# Patient Record
Sex: MALE | Race: WHITE | Employment: FULL TIME | URBAN - METROPOLITAN AREA
[De-identification: names, ages, dates, MRNs, and addresses within clinical notes are randomized per-mention and may not be internally consistent; named-entity substitution may affect disease eponyms.]

---

## 2021-03-18 ENCOUNTER — OFFICE VISIT (OUTPATIENT)
Dept: INTERNAL MEDICINE CLINIC | Age: 31
End: 2021-03-18
Payer: COMMERCIAL

## 2021-03-18 VITALS
OXYGEN SATURATION: 97 % | TEMPERATURE: 97.8 F | BODY MASS INDEX: 23.66 KG/M2 | RESPIRATION RATE: 18 BRPM | WEIGHT: 169 LBS | DIASTOLIC BLOOD PRESSURE: 80 MMHG | HEIGHT: 71 IN | HEART RATE: 78 BPM | SYSTOLIC BLOOD PRESSURE: 122 MMHG

## 2021-03-18 DIAGNOSIS — F17.200 NICOTINE DEPENDENCE, UNCOMPLICATED, UNSPECIFIED NICOTINE PRODUCT TYPE: ICD-10-CM

## 2021-03-18 DIAGNOSIS — E55.9 VITAMIN D DEFICIENCY: ICD-10-CM

## 2021-03-18 DIAGNOSIS — Z76.89 ENCOUNTER TO ESTABLISH CARE: Primary | ICD-10-CM

## 2021-03-18 DIAGNOSIS — Z98.890 HISTORY OF REPAIR OF ACL: ICD-10-CM

## 2021-03-18 PROCEDURE — 99202 OFFICE O/P NEW SF 15 MIN: CPT | Performed by: INTERNAL MEDICINE

## 2021-03-18 NOTE — PROGRESS NOTES
HISTORY OF PRESENT ILLNESS  Albert Burr is a 28 y.o. male. Pt presents to [facility] care. Has not seen a PCP in over 1 year. Past medical, surgical, and family history reviewed and updated. Has hx of ADHD but not currently on any medications. Does not intend to be on any medication in the near future. Only concern today is continued left knee pain after having ACL repair in 2007. Has been running more recently and notices pain with this. Denies any other joint pain, denies chest pain, shortness of breath, dizziness/lightheadedness, headache, or any additional concerns. Works as  and has long work weeks (~90hrs). Wife is 8 months pregnant. Visit Vitals  /80 (BP 1 Location: Right upper arm, BP Patient Position: Sitting, BP Cuff Size: Adult)   Pulse 78   Temp 97.8 °F (36.6 °C) (Oral)   Resp 18   Ht 5' 11\" (1.803 m)   Wt 169 lb (76.7 kg)   SpO2 97%   BMI 23.57 kg/m²     Past Medical History:   Diagnosis Date    ADHD     Lump in chest     no abnormality found on sonogram     Past Surgical History:   Procedure Laterality Date    HX ORTHOPAEDIC      acl reconstruction left knee-2007     Family History   Problem Relation Age of Onset    Cancer Mother 58        breast cancer    Ulcerative Colitis Sister      No outpatient encounter medications on file as of 3/18/2021. No facility-administered encounter medications on file as of 3/18/2021. Review of Systems   Constitutional: Negative. Respiratory: Negative. Cardiovascular: Negative. Gastrointestinal: Negative. Genitourinary: Negative. Musculoskeletal: Positive for joint pain. Skin: Negative. Neurological: Negative for dizziness and weakness. Psychiatric/Behavioral: Negative. Physical Exam  Constitutional:       Appearance: Normal appearance. He is normal weight. HENT:      Head: Normocephalic.       Right Ear: Tympanic membrane normal.      Left Ear: Tympanic membrane normal.   Eyes:      Pupils: Pupils are equal, round, and reactive to light. Neck:      Musculoskeletal: Normal range of motion and neck supple. Cardiovascular:      Rate and Rhythm: Normal rate and regular rhythm. Pulses: Normal pulses. Heart sounds: Normal heart sounds. Abdominal:      General: Bowel sounds are normal.      Palpations: Abdomen is soft. Tenderness: There is no abdominal tenderness. Musculoskeletal: Normal range of motion. Skin:     General: Skin is warm and dry. Neurological:      Mental Status: He is alert and oriented to person, place, and time. Psychiatric:         Mood and Affect: Mood normal.         Behavior: Behavior normal.         ASSESSMENT and PLAN  Diagnoses and all orders for this visit:    1. Encounter to establish care    2. Nicotine dependence, uncomplicated, unspecified nicotine product type  -     CBC WITH AUTOMATED DIFF; Future  -     METABOLIC PANEL, COMPREHENSIVE; Future  -     LIPID PANEL; Future    3. Vitamin D deficiency  -     VITAMIN D, 25 HYDROXY; Future    4. History of repair of ACL  -     REFERRAL TO ORTHOPEDICS      Diagnoses and all orders for this visit:    1. Encounter to establish care  2. Nicotine dependence, uncomplicated, unspecified nicotine product type  -     CBC WITH AUTOMATED DIFF; Future  -     METABOLIC PANEL, COMPREHENSIVE; Future  -     LIPID PANEL; Future   - Will be in touch via patient portal with results. 3. Vitamin D deficiency  -     VITAMIN D, 25 HYDROXY; Future    4. History of repair of ACL  -     REFERRAL TO ORTHOPEDICS    Advised on healthy/well-balanced diet, and continue with physical activity. Congratulated on first child.    F/U yearly or PRN if needed    NICHO Edwards NP                                                                                                                Consent For Provider Observation  [facility] (\"[facility]\") has agreed to permit a provider employed by [facility] (\"Observer\") to observe care to patients treated in its physician practices. Your physician has agreed to permit such Observer to observe his/her patient care activities. Such observation will not include any direct participation in the care provided to you. This writer has obtained verbal permission from this patient to permit Observer to observe their medical care received at [facility]. This patient agrees that they have been given the opportunity to refuse to give such consent and that they may withdraw their consent at any time, except to the extent [facility] has relied on this consent and an Observer has already observed their medical care. This consent shall remain in effect for 1 year, unless otherwise withdrawn by this patient.

## 2021-03-18 NOTE — PROGRESS NOTES
Health Maintenance Due   Topic Date Due    Hepatitis C Screening  Never done    DTaP/Tdap/Td series (2 - Tdap) 02/04/2002    Flu Vaccine (1) 09/01/2020       Chief Complaint   Patient presents with    New Patient       1. Have you been to the ER, urgent care clinic since your last visit? Hospitalized since your last visit? No    2. Have you seen or consulted any other health care providers outside of the [address] [place] [address] since your last visit? Include any pap smears or colon screening. No    3) Do you have an Advance Directive on file? no    4) Are you interested in receiving information on Advance Directives? NO      Patient is accompanied by self I have received verbal consent from Catherine Bryan to discuss any/all medical information while they are present in the room.

## 2021-06-14 ENCOUNTER — VIRTUAL VISIT (OUTPATIENT)
Dept: INTERNAL MEDICINE CLINIC | Age: 31
End: 2021-06-14
Payer: COMMERCIAL

## 2021-06-14 DIAGNOSIS — M54.9 UPPER BACK PAIN: ICD-10-CM

## 2021-06-14 DIAGNOSIS — M54.2 NECK PAIN: Primary | ICD-10-CM

## 2021-06-14 DIAGNOSIS — Z98.890 HISTORY OF REPAIR OF ACL: ICD-10-CM

## 2021-06-14 PROCEDURE — 99214 OFFICE O/P EST MOD 30 MIN: CPT | Performed by: INTERNAL MEDICINE

## 2021-06-14 RX ORDER — DICLOFENAC SODIUM 75 MG/1
75 TABLET, DELAYED RELEASE ORAL 2 TIMES DAILY
Qty: 20 TABLET | Refills: 0 | Status: SHIPPED | OUTPATIENT
Start: 2021-06-14 | End: 2022-01-12 | Stop reason: ALTCHOICE

## 2021-06-14 RX ORDER — TIZANIDINE 4 MG/1
4 TABLET ORAL 3 TIMES DAILY
Qty: 30 TABLET | Refills: 0 | Status: SHIPPED | OUTPATIENT
Start: 2021-06-14 | End: 2022-01-12 | Stop reason: ALTCHOICE

## 2021-06-14 NOTE — PROGRESS NOTES
Health Maintenance Due   Topic Date Due    Hepatitis C Screening  Never done    Pneumococcal 0-64 years (1 of 2 - PPSV23) Never done    COVID-19 Vaccine (1) Never done       No chief complaint on file. 1. Have you been to the ER, urgent care clinic since your last visit? Hospitalized since your last visit? No    2. Have you seen or consulted any other health care providers outside of the [address] [place] [address] since your last visit? Include any pap smears or colon screening. No    3) Do you have an Advance Directive on file? no    4) Are you interested in receiving information on Advance Directives? NO      Patient is accompanied by self I have received verbal consent from Yuko Concepcion to discuss any/all medical information while they are present in the room.

## 2022-01-10 ENCOUNTER — NURSE TRIAGE (OUTPATIENT)
Dept: OTHER | Facility: CLINIC | Age: 32
End: 2022-01-10

## 2022-01-10 NOTE — TELEPHONE ENCOUNTER
Received call from [facility] at [facility] with Red Flag Complaint. Subjective: Caller states \"I have very sharp neck pain that is causing limited ROM when turning my head in any direction. Anytime I move my head I have sharp shooting pain down my neck, spine, and R arm. The injury occurred while doing pull-ups in the gym yesterday, and something must tweaked something. \"     Current Symptoms:Pain radiating down  Right arm and back. Onset: 1 day ago; improving    Associated Symptoms: reduced activity    Pain Severity: 8/10; sharp; severe    Temperature: Denies     What has been tried: ibuprofen  LMP: NA Pregnant: NA    Recommended disposition: Go to [address] [place] [address] for evaluation or to office with   PCP approval. RN unable to contact PCP office and advised pt to got to [facility] for evaluation. Pt states he would rather be seen in office. Care advice provided, patient verbalizes understanding; denies any other questions or concerns; instructed to call back for any new or worsening symptoms. Message sent to scheduling to have an appointment set up in office. Attention Provider: Thank you for allowing me to participate in the care of your patient. The patient was connected to triage in response to information provided to the [facility]. Please do not respond through this encounter as the response is not directed to a shared pool.       Reason for Disposition   SEVERE neck pain (e.g., excruciating)    Protocols used: NECK INJURY-ADULT-[place]

## 2022-01-12 ENCOUNTER — OFFICE VISIT (OUTPATIENT)
Dept: INTERNAL MEDICINE CLINIC | Age: 32
End: 2022-01-12
Payer: COMMERCIAL

## 2022-01-12 VITALS
SYSTOLIC BLOOD PRESSURE: 113 MMHG | HEIGHT: 71 IN | TEMPERATURE: 98.5 F | WEIGHT: 182 LBS | OXYGEN SATURATION: 100 % | BODY MASS INDEX: 25.48 KG/M2 | DIASTOLIC BLOOD PRESSURE: 75 MMHG | RESPIRATION RATE: 16 BRPM | HEART RATE: 63 BPM

## 2022-01-12 DIAGNOSIS — M62.838 TRAPEZIUS MUSCLE SPASM: Primary | ICD-10-CM

## 2022-01-12 DIAGNOSIS — M54.2 NECK PAIN: ICD-10-CM

## 2022-01-12 DIAGNOSIS — M22.2X2 PATELLOFEMORAL SYNDROME OF LEFT KNEE: ICD-10-CM

## 2022-01-12 DIAGNOSIS — Z98.890 S/P ACL REPAIR: ICD-10-CM

## 2022-01-12 PROCEDURE — 99214 OFFICE O/P EST MOD 30 MIN: CPT | Performed by: FAMILY MEDICINE

## 2022-01-12 PROCEDURE — 72050 X-RAY EXAM NECK SPINE 4/5VWS: CPT | Performed by: FAMILY MEDICINE

## 2022-01-12 RX ORDER — PREDNISONE 20 MG/1
20 TABLET ORAL 3 TIMES DAILY
Qty: 21 TABLET | Refills: 0 | Status: SHIPPED | OUTPATIENT
Start: 2022-01-12 | End: 2022-01-19

## 2022-01-12 NOTE — PROGRESS NOTES
Chief Complaint   Patient presents with    Neck Pain     Patient is here for neck pain on the right side. he is a 28y.o. year old male who presents for evaluation of neck pain  Pain Assessment Encounter      Elvie Ruth  1/12/2022  Onset of Symptoms: Patient states he has had pain for years   ________________________________________________________________________  Description:Patient states he works out in [place] and thinks he injured neck over time. Frequency: 4-5 times a day  Pain Scale:(1-10): 6  Trauma Hx: working out   Hx of similar symptoms: Yes  Radiation: NO, neck   Duration:  continuous      Progression: has worsened  What makes it better?: heat and OTC meds  What makes it worse?:exercise and strecthing  Medications tried: ibuprofen    Reviewed and agree with Nurse Note and duplicated in this note. Reviewed PmHx, RxHx, FmHx, SocHx, AllgHx and updated and dated in the chart.     Family History   Problem Relation Age of Onset    Cancer Mother 58        breast cancer    Ulcerative Colitis Sister        Past Medical History:   Diagnosis Date    ADHD     Lump in chest     no abnormality found on sonogram      Social History     Socioeconomic History    Marital status: SINGLE   Tobacco Use    Smoking status: Light Tobacco Smoker    Smokeless tobacco: Never Used    Tobacco comment: about 5 days a month   Vaping Use    Vaping Use: Never used   Substance and Sexual Activity    Alcohol use: Yes     Comment: about 5 times a month    Drug use: No    Sexual activity: Yes     Social Determinants of Health     Physical Activity: Sufficiently Active    Days of Exercise per Week: 5 days    Minutes of Exercise per Session: 30 min        Review of Systems - negative except as listed above      Objective:     Vitals:    01/12/22 1508   BP: 113/75   Pulse: 63   Resp: 16   Temp: 98.5 °F (36.9 °C)   SpO2: 100%   Weight: 182 lb (82.6 kg)   Height: 5' 11\" (1.803 m)       Physical Examination: General appearance - alert, well appearing, and in no distress  Back exam - full range of motion, no tenderness, palpable spasm or pain on motion  Neurological - alert, oriented, normal speech, no focal findings or movement disorder noted  Musculoskeletal -neck negative Spurling bilaterally, pain to palpation the right upper trap spasm, C-spine intact, distal reflexes and distal strength of arms normal 5 out of 5  Extremities - peripheral pulses normal, no pedal edema, no clubbing or cyanosis  Skin - normal coloration and turgor, no rashes, no suspicious skin lesions noted     Assessment/ Plan:   Diagnoses and all orders for this visit:    1. Trapezius muscle spasm  -     REFERRAL TO PHYSICAL THERAPY    2. Neck pain  -     XR SPINE CERV 4 OR 5 V; Future  -     REFERRAL TO PHYSICAL THERAPY    3. S/P ACL repair  -     REFERRAL TO PHYSICAL THERAPY    4. Patellofemoral syndrome of left knee  -     REFERRAL TO PHYSICAL THERAPY    Other orders  -     predniSONE (DELTASONE) 20 mg tablet; Take 20 mg by mouth three (3) times daily for 7 days. Pathophysiology, recovery and rehabilitation process discussed and questions answered   Counseling for 30 Minutes of the total visit duration   Pictures and figures used as necessary   Provided reassurance   Recommend activity modification   Recommend  lower impact activities-walking, Eliptical, Nordic Track, cycling or swimming   Follow up in 4 month(s)             1) Remember to stay active and/or exercise regularly (I suggest 30-45 minutes daily)   2) For reliable dietary information, go to www. EATRIGHT.org. You may wish to consider seeing the nutritionist at [facility] [phone], also consider the [address] I have discussed the diagnosis with the patient and the intended plan as seen in the above orders. The patient has received an after-visit summary and questions were answered concerning future plans.      Medication Side Effects and Warnings were discussed with patient,  Patient Labs were reviewed and or requested, and  Patient Past Records were reviewed and or requested  yes      Pt agrees to call or return to clinic and/or go to closest ER with any worsening of symptoms. This may include, but not limited to increased fever (>100.4) with NSAIDS or Tylenol, increased edema, confusion, rash, worsening of presenting symptoms. Please note that this dictation was completed with [employer], the computer voice recognition software. Quite often unanticipated grammatical, syntax, homophones, and other interpretive errors are inadvertently transcribed by the computer software. Please disregard these errors. Please excuse any errors that have escaped final proofreading. Thank you.

## 2022-02-01 ENCOUNTER — HOSPITAL ENCOUNTER (OUTPATIENT)
Dept: PHYSICAL THERAPY | Age: 32
Discharge: HOME | End: 2022-02-01
Payer: COMMERCIAL

## 2022-02-01 PROCEDURE — 97110 THERAPEUTIC EXERCISES: CPT | Performed by: PHYSICAL THERAPIST

## 2022-02-01 PROCEDURE — 97161 PT EVAL LOW COMPLEX 20 MIN: CPT | Performed by: PHYSICAL THERAPIST

## 2022-02-01 NOTE — PROGRESS NOTES
PT INITIAL EVALUATION NOTE 2-15    Patient Name: Val Gil  FLPU:[id number]  : 1990  [x]  Patient  Verified  Payor: Christy De Paz / Plan: [facility] HMO/CHOICE PLUS/POS / Product Type: HMO /    In time: 845a  Out time: 910a  Total Treatment Time (min): 25  Visit #: 1     Treatment Area: Neck pain [M54.2]  Trapezius muscle spasm [M62.838]  Patellofemoral disorders, left knee [M22.2X2]    SUBJECTIVE  Pain Level (0-10 scale): 2  Any medication changes, allergies to medications, adverse drug reactions, diagnosis change, or new procedure performed?: [] No    [x] Yes (see summary sheet for update)  Subjective:     Patient reports with 9 month history of R sided neck pain. Felt it doing a pull up at the gym. He got a massage and rested it for a while and it went away. Pain came back in December but he was working through it. He was doing a pull up a few weeks ago and felt severe R sided neck pain. He was pretty immobilized for at least 24 hours. It is getting better overall, but wants to prevent the next issue from happening. He also notices some issues with running. He had a L ACL tear 12 years ago. He does more running v. Any other exercise. Works in an office with a sedentary office job, and hasn't noticed any issues. As he associates his pain with running, would like to participate in a run analysis during course of PT.     OBJECTIVE/EXAMINATION  Posture:   Forward head, rounded shoulders  Palpation: No specific TTP, some increased muscle turgor just medial to levator insertion and transverse processes of T1-2         Cervical AROM:         R  L  Flexion     35deg, tightness    Extension    55deg    Side Bending    30deg bilaterally  Rotation    75deg bilaterally        UPPER QUARTER   MUSCLE STRENGTH  KEY       R  L  0 - No Contraction  C1, C2 Neck Flex --  --  1 - Trace   C3 Side Flex  --  --  2 - Poor   C4 Sh Elev  --  --  3 - Fair    C5 Deltoid/Biceps 5  5  4 - Good   C6 Wrist Ext  --  --  5 - Normal   C7 Triceps  --  --      C8 Thumb Ext  --  --      T1 Hand Inst  -  --    Mobility Assessment: Cervical P-A mobility: mildly hypomobile, mostly around C6-7 region     Upsloping glides: mild hypomobility over C6-7 on R     Downsloping glides: hypomobility and mild discomfort R side C6-7 to T1-2     Thoracic P-A mobility: significant hypomobile throughout, from T1-T10     MMT: deferred  Neurological: Reflexes / Sensations: nt  Special Tests:    Spurlings: (-)   Cervical Compression (-)  Cervical Distraction: (-)    8 min Therapeutic Exercise:  [x] See flow sheet : preparation of HEP, review of indications for PT focusing on thoracic mobility to reduce neck pain   Rationale: increase ROM, increase strength and improve coordination to improve the patients ability to exercise without pain          With   [] TE   [] TA   [] Neuro   [] SC   [] other: Patient Education: [x] Review HEP    [] Progressed/Changed HEP based on:   [] positioning   [] body mechanics   [] transfers   [] heat/ice application    [] other:        Other Objective/Functional Measures: FOTO Functional Measure: deferred to next session    Pain Level (0-10 scale) post treatment: 2      ASSESSMENT:      [x]  See Plan of ZOILA Reeder, DPT 2/1/2022

## 2022-02-01 NOTE — PROGRESS NOTES
Physical Therapy at [facility],   a part of [address] [place] [address]  [address] [address], [address] [place] [address], [address] [place] [address]  Phone: [phone]  Fax: [phone]    Plan of Care/Statement of Necessity for Physical Therapy Services  2-15    Patient name: Ashley Bettencourt  : 1990  Provider#: [id number]  Referral source: Zechariah Edwards MD      Medical/Treatment Diagnosis: Neck pain [M54.2]  Trapezius muscle spasm [M62.838]  Patellofemoral disorders, left knee [M22.2X2]     Prior Hospitalization: see medical history     Comorbidities: none reported  Prior Level of Function: able to lift, exercise without increase in pain  Medications: Verified on Patient Summary List    Start of Care: 23      Onset Date: 9 months ago       The Plan of Care and following information is based on the information from the initial evaluation. Assessment/ key information: Patient reports with acute onset of R sided neck pain limiting function. Presentation most consistent with a lower cervical/upper thoracic facet dysfunction. Noted thoracic hypomobility>cervical hypomobility today, and some muscle tightness likely leading to dysfunction.  Will focus on cervico-thoracic postural corrections and mobility exercises to improve funciton    Evaluation Complexity History LOW Complexity : Zero comorbidities / personal factors that will impact the outcome / POC; Examination MEDIUM Complexity : 3 Standardized tests and measures addressing body structure, function, activity limitation and / or participation in recreation  ;Presentation LOW Complexity : Stable, uncomplicated    Overall Complexity Rating: LOW     Problem List: pain affecting function, decrease ROM, decrease strength, decrease ADL/ functional abilitiies, decrease activity tolerance and decrease flexibility/ joint mobility   Treatment Plan may include any combination of the following: Therapeutic exercise, Therapeutic activities, Neuromuscular re-education, Physical agent/modality, Manual therapy, Patient education and Self Care training  Patient / Family readiness to learn indicated by: asking questions and interest  Persons(s) to be included in education: patient (P)  Barriers to Learning/Limitations: None  Patient Goal (s): stretching, strengthening  Patient Self Reported Health Status: good  Rehabilitation Potential: good    Long Term Goals: To be accomplished in 10-12 treatments:   1. Pt will be able to participate in full workouts without increase in symptoms   2. Pt will participate in run analysis in order to help correct mechanics for improved running stride   3. Pt will report at least 75% improvement in symptoms   4. Pt will be able to self-manage care using updated HEP for improved independence  Frequency / Duration: Patient to be seen 1-2 times per week for 10-12 treatments. Patient/ Caregiver education and instruction: self care and exercises    [x]  Plan of care has been reviewed with DEANDRA Coello Dear, PT, DPT 2/1/2022     ________________________________________________________________________    I certify that the above Therapy Services are being furnished while the patient is under my care. I agree with the treatment plan and certify that this therapy is necessary.     [de-identified] Signature:____________________  Date:____________Time: _________      Zechariah Edwards MD

## 2022-02-16 ENCOUNTER — HOSPITAL ENCOUNTER (OUTPATIENT)
Dept: PHYSICAL THERAPY | Age: 32
Discharge: HOME | End: 2022-02-16
Payer: COMMERCIAL

## 2022-02-16 PROCEDURE — 97110 THERAPEUTIC EXERCISES: CPT | Performed by: PHYSICAL THERAPIST

## 2022-02-16 PROCEDURE — 97140 MANUAL THERAPY 1/> REGIONS: CPT | Performed by: PHYSICAL THERAPIST

## 2022-02-16 NOTE — PROGRESS NOTES
PT DAILY TREATMENT NOTE 2-15    Patient Name: Ashley Bettencourt  Date:2022  : 1990  [x]  Patient  Verified  Payor: Timoteojackie Laraparisa / Plan: [employer] HMO/CHOICE PLUS/POS / Product Type: HMO /    In time: 044P  Out time: 543p  Total Treatment Time (min): 61  Visit #:  2    Treatment Area: Neck pain [M54.2]  Trapezius muscle spasm [M62.838]  Patellofemoral disorders, left knee [M22.2X2]    SUBJECTIVE  Pain Level (0-10 scale): 0  Any medication changes, allergies to medications, adverse drug reactions, diagnosis change, or new procedure performed?: [x] No    [] Yes (see summary sheet for update)  Subjective functional status/changes:   [] No changes reported  Doing well today. Nothing new to report in regards to the neck pain. Didn't get his HEP in an e-mail. OBJECTIVE        44 min Therapeutic Exercise:  [x] See flow sheet : single limb squat assessment   Rationale: increase ROM, increase strength and improve coordination to improve the patients ability to exercise without pain    15 min Manual Therapy:  Levator scapula stretching, P-A mobs, upsloping/downsloping mobs C6-7, Thoracic P-A mobilizations GIII-IV T2-12, upper thoracic manipulation delivered with patient's consent   Rationale: decrease pain, increase ROM, increase tissue extensibility, decrease trigger points and increase postural awareness  to improve the patients ability to exercise without pain    With   [] TE   [] TA   [] Neuro   [] SC   [] other: Patient Education: [x] Review HEP    [] Progressed/Changed HEP based on:   [] positioning   [] body mechanics   [] transfers   [] heat/ice application    [] other:      Other Objective/Functional Measures: Quad measurement: 41cm on L, 45cm on R     Pain Level (0-10 scale) post treatment: 0    ASSESSMENT/Changes in Function:   Doing well overall. Will work on scapular strengthening next session as well as a partial run analysis prior to anticipated d/c.    Patient will continue to benefit from skilled PT services to modify and progress therapeutic interventions, address functional mobility deficits, address ROM deficits, address strength deficits, analyze and address soft tissue restrictions and analyze and cue movement patterns to attain remaining goals. []  See Plan of Care  []  See progress note/recertification  []  See Discharge Summary         Progress towards goals / Updated goals:    Long Term Goals:  To be accomplished in 10-12 treatments:               1. Pt will be able to participate in full workouts without increase in symptoms               2. Pt will participate in run analysis in order to help correct mechanics for improved running stride               3. Pt will report at least 75% improvement in symptoms               4. Pt will be able to self-manage care using updated HEP for improved independence    PLAN  [x]  Upgrade activities as tolerated     [x]  Continue plan of care  []  Update interventions per flow sheet       []  Discharge due to:_  []  Other:_      Dipti Tobias, PT, DPT 2/16/2022

## 2022-02-22 ENCOUNTER — APPOINTMENT (OUTPATIENT)
Dept: PHYSICAL THERAPY | Age: 32
End: 2022-02-22
Payer: COMMERCIAL

## 2022-02-25 ENCOUNTER — HOSPITAL ENCOUNTER (OUTPATIENT)
Dept: PHYSICAL THERAPY | Age: 32
Discharge: HOME | End: 2022-02-25
Payer: COMMERCIAL

## 2022-02-25 PROCEDURE — 97110 THERAPEUTIC EXERCISES: CPT | Performed by: PHYSICAL THERAPIST

## 2022-02-25 PROCEDURE — 97140 MANUAL THERAPY 1/> REGIONS: CPT | Performed by: PHYSICAL THERAPIST

## 2022-02-25 PROCEDURE — 97530 THERAPEUTIC ACTIVITIES: CPT | Performed by: PHYSICAL THERAPIST

## 2022-02-25 NOTE — PROGRESS NOTES
Physical Therapy at [facility],   a part of [address], [address] [place] [address], [address]  Phone: [phone]  Fax: [phone]    Discharge Summary  2-15    Patient name: Erickson Johns  : 1990  Provider#: [id number]  Referral source: Malcolm Everett MD      Medical/Treatment Diagnosis: Neck pain [M54.2]  Trapezius muscle spasm [M62.838]  Patellofemoral disorders, left knee [M22.2X2]     Prior Hospitalization: see medical history     Comorbidities: none reported  Prior Level of Function: able to lift, exercise without increase in pain  Medications: Verified on Patient Summary List     Start of Care: 22                                                                       Onset Date: 9 months ago            Visits from Start of Care: 3     Missed Visits: 0  Reporting Period : 22 to 22      ASSESSMENT/SUMMARY OF CARE: Mr. Rebbecca Cogan progressed well over the course of 3 sessions working mostly on preventative measures to correct recurrent neck pain. He has been able to continue to manage his care independently without issue at this time. We also performed a run analysis with him today, in which we noted some mild deficiencies in his stride, mainly with his knee more extended at heel strike on L, and some L trunk lean as well, likely due to gluteal weakness. Long Term Goals: To be accomplished in 10-12 treatments:               1.  Pt will be able to participate in full workouts without increase in symptoms MET               2. Pt will participate in run analysis in order to help correct mechanics for improved running stride MET               3. Pt will report at least 75% improvement in symptoms MET               4. Pt will be able to self-manage care using updated HEP for improved independence MET    RECOMMENDATIONS:  [x]Discontinue therapy: [x]Patient has reached or is progressing toward set goals      []Patient is non-compliant or has abdicated      []Due to lack of appreciable progress towards set [address] Davey Glass, PT, DPT 2/25/2022

## 2022-02-25 NOTE — PROGRESS NOTES
PT DAILY TREATMENT NOTE 2-15    Patient Name: Gaby Cheng  Date:2022  : 1990  [x]  Patient  Verified  Payor: Letha Sterling / Plan: [employer] HMO/CHOICE PLUS/POS / Product Type: HMO /    In time: 333U  Out time: 800a  Total Treatment Time (min): 58  Visit #: 3    Treatment Area: Neck pain [M54.2]  Trapezius muscle spasm [M62.838]  Patellofemoral disorders, left knee [M22.2X2]    SUBJECTIVE  Pain Level (0-10 scale): 0  Any medication changes, allergies to medications, adverse drug reactions, diagnosis change, or new procedure performed?: [x] No    [] Yes (see summary sheet for update)  Subjective functional status/changes:   [] No changes reported  Doing well today. Neck hasn't been any issue for him recently. OBJECTIVE    15 min Therapeutic Exercise:  [x]? See flow sheet :    Rationale: increase ROM, increase strength and improve coordination to improve the patients ability to exercise without pain     28 min Therapeutic Activity:  [x]  See flow sheet : run analysis   Rationale: increase strength and improve coordination  to improve the patients ability to run without difficulty    15 min Manual Therapy:  Levator scapula stretching, P-A mobs, upsloping/downsloping mobs C6-7, Thoracic P-A mobilizations GIII-IV T2-12   Rationale: decrease pain, increase ROM, increase tissue extensibility, decrease trigger points and increase postural awareness  to improve the patients ability to exercise without pain     With   []? TE   []? TA   []? Neuro   []? SC   []? other: Patient Education: [x]? Review HEP    []? Progressed/Changed HEP based on:   []? positioning   []? body mechanics   []? transfers   []? heat/ice application    []?  other:       Other Objective/Functional Measures:   Run Analysis  Stacia 174 steps/min  Knee extended at initial contact on L side v. R  L trunk lean in L stance     Pain Level (0-10 scale) post treatment: 0     ASSESSMENT/Changes in Function:   Overall running stride looks pretty good. He does keep that knee in a little more extension v. R side at heel strike. Also leaning somewhat with L trunk during L stance, likely compensating for knee and hip weakness. Patient will continue to benefit from skilled PT services to modify and progress therapeutic interventions, address functional mobility deficits, address ROM deficits, address strength deficits, analyze and address soft tissue restrictions and analyze and cue movement patterns to attain remaining goals. []? See Plan of Care  []? See progress note/recertification  []? See Discharge Summary         Progress towards goals / Updated goals:     Long Term Goals: To be accomplished in 10-12 treatments:               1. Pt will be able to participate in full workouts without increase in symptoms MET               2. Pt will participate in run analysis in order to help correct mechanics for improved running stride MET               3. Pt will report at least 75% improvement in symptoms MET               4. Pt will be able to self-manage care using updated HEP for improved independence MET     PLAN  []? Upgrade activities as tolerated     []? Continue plan of care  []? Update interventions per flow sheet       [x]? Discharge due to: progress  []?   Other:_        Indio Núñez, PT, DPT 2/25/2022

## 2025-09-01 ENCOUNTER — HOSPITAL ENCOUNTER (EMERGENCY)
Facility: HOSPITAL | Age: 35
Discharge: HOME | End: 2025-09-01
Attending: EMERGENCY MEDICINE
Payer: COMMERCIAL

## 2025-09-01 VITALS
RESPIRATION RATE: 16 BRPM | SYSTOLIC BLOOD PRESSURE: 145 MMHG | HEART RATE: 69 BPM | HEIGHT: 71 IN | TEMPERATURE: 97.5 F | WEIGHT: 180.56 LBS | BODY MASS INDEX: 25.28 KG/M2 | OXYGEN SATURATION: 97 % | DIASTOLIC BLOOD PRESSURE: 88 MMHG

## 2025-09-01 DIAGNOSIS — S90.822A BLISTER OF LEFT FOOT, INITIAL ENCOUNTER: Primary | ICD-10-CM

## 2025-09-01 PROCEDURE — 6360000002 HC RX W HCPCS

## 2025-09-01 PROCEDURE — 99284 EMERGENCY DEPT VISIT MOD MDM: CPT

## 2025-09-01 PROCEDURE — 10060 I&D ABSCESS SIMPLE/SINGLE: CPT

## 2025-09-01 PROCEDURE — 90714 TD VACC NO PRESV 7 YRS+ IM: CPT

## 2025-09-01 PROCEDURE — 90471 IMMUNIZATION ADMIN: CPT

## 2025-09-01 RX ADMIN — CLOSTRIDIUM TETANI TOXOID ANTIGEN (FORMALDEHYDE INACTIVATED) AND CORYNEBACTERIUM DIPHTHERIAE TOXOID ANTIGEN (FORMALDEHYDE INACTIVATED) 0.5 ML: 5; 2 INJECTION, SUSPENSION INTRAMUSCULAR at 09:25

## 2025-09-01 ASSESSMENT — PAIN DESCRIPTION - PAIN TYPE: TYPE: ACUTE PAIN

## 2025-09-01 ASSESSMENT — PAIN DESCRIPTION - FREQUENCY: FREQUENCY: INTERMITTENT

## 2025-09-01 ASSESSMENT — PAIN DESCRIPTION - LOCATION: LOCATION: FOOT

## 2025-09-01 ASSESSMENT — PAIN - FUNCTIONAL ASSESSMENT: PAIN_FUNCTIONAL_ASSESSMENT: 0-10

## 2025-09-01 ASSESSMENT — PAIN DESCRIPTION - DESCRIPTORS: DESCRIPTORS: TENDER

## 2025-09-01 ASSESSMENT — PAIN DESCRIPTION - ONSET: ONSET: ON-GOING

## 2025-09-01 ASSESSMENT — PAIN DESCRIPTION - ORIENTATION: ORIENTATION: LEFT

## 2025-09-01 ASSESSMENT — PAIN SCALES - GENERAL: PAINLEVEL_OUTOF10: 8
